# Patient Record
Sex: FEMALE | Race: WHITE | URBAN - METROPOLITAN AREA
[De-identification: names, ages, dates, MRNs, and addresses within clinical notes are randomized per-mention and may not be internally consistent; named-entity substitution may affect disease eponyms.]

---

## 2017-01-04 ENCOUNTER — OUTPATIENT (OUTPATIENT)
Dept: OUTPATIENT SERVICES | Facility: HOSPITAL | Age: 47
LOS: 1 days | Discharge: HOME | End: 2017-01-04

## 2017-06-27 DIAGNOSIS — Z12.11 ENCOUNTER FOR SCREENING FOR MALIGNANT NEOPLASM OF COLON: ICD-10-CM

## 2017-06-27 DIAGNOSIS — K64.4 RESIDUAL HEMORRHOIDAL SKIN TAGS: ICD-10-CM

## 2018-06-09 ENCOUNTER — OUTPATIENT (OUTPATIENT)
Dept: OUTPATIENT SERVICES | Facility: HOSPITAL | Age: 48
LOS: 1 days | Discharge: HOME | End: 2018-06-09

## 2018-06-09 DIAGNOSIS — Z12.31 ENCOUNTER FOR SCREENING MAMMOGRAM FOR MALIGNANT NEOPLASM OF BREAST: ICD-10-CM

## 2018-06-15 ENCOUNTER — OUTPATIENT (OUTPATIENT)
Dept: OUTPATIENT SERVICES | Facility: HOSPITAL | Age: 48
LOS: 1 days | Discharge: HOME | End: 2018-06-15

## 2018-06-15 DIAGNOSIS — R92.8 OTHER ABNORMAL AND INCONCLUSIVE FINDINGS ON DIAGNOSTIC IMAGING OF BREAST: ICD-10-CM

## 2019-12-04 ENCOUNTER — OUTPATIENT (OUTPATIENT)
Dept: OUTPATIENT SERVICES | Facility: HOSPITAL | Age: 49
LOS: 1 days | Discharge: HOME | End: 2019-12-04
Payer: COMMERCIAL

## 2019-12-04 DIAGNOSIS — Z12.31 ENCOUNTER FOR SCREENING MAMMOGRAM FOR MALIGNANT NEOPLASM OF BREAST: ICD-10-CM

## 2019-12-04 PROCEDURE — 77063 BREAST TOMOSYNTHESIS BI: CPT | Mod: 26

## 2019-12-04 PROCEDURE — 77067 SCR MAMMO BI INCL CAD: CPT | Mod: 26

## 2019-12-06 ENCOUNTER — OUTPATIENT (OUTPATIENT)
Dept: OUTPATIENT SERVICES | Facility: HOSPITAL | Age: 49
LOS: 1 days | Discharge: HOME | End: 2019-12-06
Payer: COMMERCIAL

## 2019-12-06 DIAGNOSIS — R92.8 OTHER ABNORMAL AND INCONCLUSIVE FINDINGS ON DIAGNOSTIC IMAGING OF BREAST: ICD-10-CM

## 2019-12-06 PROCEDURE — 76642 ULTRASOUND BREAST LIMITED: CPT | Mod: 26,LT

## 2019-12-06 PROCEDURE — 77065 DX MAMMO INCL CAD UNI: CPT | Mod: 26,LT

## 2019-12-06 PROCEDURE — G0279: CPT | Mod: 26,LT

## 2019-12-06 PROCEDURE — 77061 BREAST TOMOSYNTHESIS UNI: CPT | Mod: 26,LT

## 2021-01-29 ENCOUNTER — OUTPATIENT (OUTPATIENT)
Dept: OUTPATIENT SERVICES | Facility: HOSPITAL | Age: 51
LOS: 1 days | Discharge: HOME | End: 2021-01-29
Payer: COMMERCIAL

## 2021-01-29 DIAGNOSIS — Z12.31 ENCOUNTER FOR SCREENING MAMMOGRAM FOR MALIGNANT NEOPLASM OF BREAST: ICD-10-CM

## 2021-01-29 PROCEDURE — 77063 BREAST TOMOSYNTHESIS BI: CPT | Mod: 26

## 2021-01-29 PROCEDURE — 77067 SCR MAMMO BI INCL CAD: CPT | Mod: 26

## 2021-02-12 ENCOUNTER — APPOINTMENT (OUTPATIENT)
Dept: OTOLARYNGOLOGY | Facility: CLINIC | Age: 51
End: 2021-02-12
Payer: COMMERCIAL

## 2021-02-12 VITALS
SYSTOLIC BLOOD PRESSURE: 118 MMHG | BODY MASS INDEX: 21.21 KG/M2 | DIASTOLIC BLOOD PRESSURE: 70 MMHG | WEIGHT: 132 LBS | HEIGHT: 66 IN

## 2021-02-12 PROCEDURE — 92550 TYMPANOMETRY & REFLEX THRESH: CPT

## 2021-02-12 PROCEDURE — 99203 OFFICE O/P NEW LOW 30 MIN: CPT | Mod: 25

## 2021-02-12 PROCEDURE — 99072 ADDL SUPL MATRL&STAF TM PHE: CPT

## 2021-02-12 PROCEDURE — 31575 DIAGNOSTIC LARYNGOSCOPY: CPT

## 2021-02-12 PROCEDURE — 92557 COMPREHENSIVE HEARING TEST: CPT

## 2021-02-12 NOTE — ASSESSMENT
[FreeTextEntry1] : -throat discomfort:\par I discussed the pathophysiology of acid reflux and its effect on the laryngo-pharynx.\par I explained the need to first control the diet as much as possible with having early dinners, avoiding certain types of food in the evening including coffee, tomato sauce, chocolate, garlic...\par \par -Left conductive hearing loss, chronic, worsening: \par I reviewed, interpreted, and discussed the Audiogram done today.\par I recommended and discussed a CT scan to r/o a chronic otitis media  or cholesteatoma. I explained the need for surgery in case of cholesteatoma to avoid bone destruction and central nervous system complications.\par \par -impacted wax cleaned with a curette.\par I counseled the patient regarding avoiding overusing qtips and explained the risks of infection, eardrum perforation, ear canal irritation, and injury, impacted wax with conductive hearing loss...\par \par I reviewed patient's historical chart.\par \par

## 2021-02-12 NOTE — HISTORY OF PRESENT ILLNESS
[de-identified] : Patient presents today with c/o hearing loss in the left ear. Patient admits gradual hearing loss over the years. She hears a whistle in the ear. No otalgia. Patient has h/o ear tube in the left ear years ago. She admits fluid and pressure feeling in the left ear. No recent audiogram. \par History of ear tube on the left side years ago. \par \par Also complaining of throat discomfort with episodic changes in her voice where it would go back to normal.

## 2021-03-09 ENCOUNTER — OUTPATIENT (OUTPATIENT)
Dept: OUTPATIENT SERVICES | Facility: HOSPITAL | Age: 51
LOS: 1 days | Discharge: HOME | End: 2021-03-09
Payer: COMMERCIAL

## 2021-03-09 ENCOUNTER — RESULT REVIEW (OUTPATIENT)
Age: 51
End: 2021-03-09

## 2021-03-09 DIAGNOSIS — H90.12 CONDUCTIVE HEARING LOSS, UNILATERAL, LEFT EAR, WITH UNRESTRICTED HEARING ON THE CONTRALATERAL SIDE: ICD-10-CM

## 2021-03-09 PROCEDURE — 70481 CT ORBIT/EAR/FOSSA W/DYE: CPT | Mod: 26

## 2021-04-12 ENCOUNTER — APPOINTMENT (OUTPATIENT)
Dept: OTOLARYNGOLOGY | Facility: CLINIC | Age: 51
End: 2021-04-12
Payer: COMMERCIAL

## 2021-04-12 PROCEDURE — 99072 ADDL SUPL MATRL&STAF TM PHE: CPT

## 2021-04-12 PROCEDURE — 99214 OFFICE O/P EST MOD 30 MIN: CPT | Mod: 25

## 2021-04-12 PROCEDURE — 69210 REMOVE IMPACTED EAR WAX UNI: CPT

## 2021-04-12 NOTE — DATA REVIEWED
[de-identified] : EXAM:  CT TEMPORAL BONES IC\par \par \par PROCEDURE DATE:  03/09/2021\par \par \par \par \par INTERPRETATION:  CLINICAL INDICATION: Left-sided conductive hearing loss. Evaluate for otosclerosis, cholesteatoma.\par \par TECHNIQUE: Multidetector axial CT images of the temporal bones were obtained after the administration of 100 cc Optiray 320 intravenous contrast (25 cc discarded). Target axial, coronal and Poschl images were created through each temporal bone in bone windows.\par \par \par COMPARISON: None\par \par FINDINGS:\par \par RIGHT:\par Mastoid air cells: The mastoid air cells are slightly underpneumatized. There is trace fluid within the right mastoid air cells. The tegmen mastoideum is intact.\par \par Outer ear: The external auditory canal appears normal.  The tympanic membrane is intact.\par \par Middle ear: The middle ear cavity is clear. The ossicles and scutum are intact without erosion. The tegmen tympani is intact.\par \par Inner ear: The otic capsule and inner ear structures appear intact. The vestibular aqueduct is normal in size.\par \par Facial nerve canal: Normal course and caliber.\par \par Vascular structures:  The sigmoid plate is intact.  The carotid canal is covered by bone.\par \par Internal auditory canal: Normal in size\par \par \par LEFT:\par Mastoid air cells: The mastoid air cells are well developed and clear. The tegmen mastoideum is intact.\par \par Outer ear:  The external auditory canal appears normal. The tympanic membrane is intact.\par \par Middle ear: The middle ear cavity is clear. The ossicles and scutum are intact without erosion. The tegmen tympani is intact.\par \par Inner ear: The otic capsule and inner ear structures appear intact. The vestibular aqueduct is slightly prominent in size but within normal limits.\par \par Facial nerve canal: Normal course and caliber.\par \par Vascular structures:  The sigmoid plate is intact.  The carotid canal is covered by bone.\par \par Internal auditory canal: Normal in size\par \par \par Other:\par Partially visualized intracranial structures: Normal.\par \par Partially visualized paranasal sinuses: Minimal mucosal thickening of the left maxillary sinus.\par \par IMPRESSION:\par \par Mild prominence of the left testicle aqueduct which is still within normal limits. Otherwise unremarkable CT of the temporal bone.\par \par Trace right mastoid effusion.\par \par Essentially unremarkable CT examination of the temporal bones.\par \par \par \par \par HONG STOCKTON M.D., RESIDENT RADIOLOGIST\par This document has been electronically signed.\par ROMERO HAYWOOD M.D., ATTENDING RADIOLOGIST\par This document has been electronically signed. Mar  9 2021  1:00PM\par \par  \par \par  Ordered by: AURELIA CHAVARRIA       Collected/Examined: 09Mar2021 08:35AM       \par Verified by: AURELIA CHAVARRIA 09Mar2021 07:08PM       \par  Result Communication: No patient communication needed at this time;\par Stage: Final       \par  Performed at: [facility]       Resulted: 09Mar2021 11:07AM       Last Updated: 09Mar2021 07:08PM       Accession: [id number]

## 2021-04-12 NOTE — HISTORY OF PRESENT ILLNESS
[de-identified] : Patient presents today with c/o hearing loss in the left ear. Patient admits gradual hearing loss over the years. She hears a whistle in the ear. No otalgia. Patient has h/o ear tube in the left ear years ago. She admits fluid and pressure feeling in the left ear. No recent audiogram. \par History of ear tube on the left side years ago. \par \par Also complaining of throat discomfort with episodic changes in her voice where it would go back to normal. [FreeTextEntry1] : \par 4/12/21: Patient presents today following up on hearing loss.  Patient sent for CT scan of temporal bones;

## 2021-04-12 NOTE — ASSESSMENT
[FreeTextEntry1] : I personally reviewed, interpreted and discussed patient's CT temporal bones images. No cholesteatoma.\par \par I explained to the patient the alternatives of hearing aids vs surgery for middle ear exploration and repair. Patient decided to see Dr Peters to discuss surgery first and will let us know.\par

## 2021-04-12 NOTE — PHYSICAL EXAM
[Midline] : trachea located in midline position [Normal] : no rashes [de-identified] : bilateral cerumen impaction, cleaned with curette.

## 2021-05-11 ENCOUNTER — APPOINTMENT (OUTPATIENT)
Dept: OTOLARYNGOLOGY | Facility: CLINIC | Age: 51
End: 2021-05-11
Payer: COMMERCIAL

## 2021-05-11 PROCEDURE — 99215 OFFICE O/P EST HI 40 MIN: CPT | Mod: 25

## 2021-05-11 PROCEDURE — 99072 ADDL SUPL MATRL&STAF TM PHE: CPT

## 2021-05-11 PROCEDURE — 92504 EAR MICROSCOPY EXAMINATION: CPT

## 2021-05-11 NOTE — PHYSICAL EXAM
[FreeTextEntry1] : Procedure: Microscopic Ear Exam\par \par Left ear:  \par Ear canal patent. Tympanic membrane intact. Thickened with mild posterior tympanic membrane retraction. No inflammation or effusion noted.\par \par \par Right ear:  \par Ear canal patent. Tympanic membrane intact. Posterior retraction pocket with contact to the ossicular chain and apparent acute mild contusion. No effusion or acute inflammation noted.\par \par T[place]ork Hearing Assessment\par 512 Hz:\par Henriquez test: referred to the left ear\par Rinne test:\par 	Right Ear: Air Conduction > Bone Conduction\par 	Left Ear:   Air Conduction < Bone conduction\par  [Normal] : mucosa is normal [Midline] : trachea located in midline position

## 2021-05-11 NOTE — CONSULT LETTER
[Please see my note below.] : Please see my note below. [FreeTextEntry2] : Dear Dr Chani Cedeño\par [address] [facility] [FreeTextEntry1] : Thank you for allowing me to participate in the care of LELE HOUGH .\par Please see the attached visit note.\par \par \par \par Dg Peters\par Otology\par Medical Director of [facility]\par Department of Otolaryngology\par [facility]

## 2021-05-11 NOTE — HISTORY OF PRESENT ILLNESS
[de-identified] : LELE HOUGH has a history of progressive hearing loss in the left ear for several years.  Previously treated for otitis media with ventilation tube with some benefit.  Many ear infections as a child. NO otorrhea for many years. Hearing loss has interfered with communication at times.\par difficulty reported with the ears due to [place] challenge. The patient states that she flew yesterdaywith some discomfort in the ears.

## 2021-05-11 NOTE — ASSESSMENT
[FreeTextEntry1] : Conductive hearing loss in the left ear with a prior history of infectious ear disease and eustachian tube dysfunction. Management options reviewed with the patient in detail including observation, amplification, and surgical intervention. She was to consider surgical intervention.All risks limitations, complications and alternatives reviewed in detail.  Questions answered.\par \par Surgical plan: Left middle ear exploration for ossiculoplasty, possible stapedectomy, cartilage graft\par \par

## 2021-05-11 NOTE — REASON FOR VISIT
[Initial Evaluation] : an initial evaluation for [Ear Pain] : ear pain [Hearing Loss] : hearing loss [Tinnitus] : tinnitus

## 2022-04-26 ENCOUNTER — APPOINTMENT (OUTPATIENT)
Dept: OTOLARYNGOLOGY | Facility: CLINIC | Age: 52
End: 2022-04-26
Payer: COMMERCIAL

## 2022-04-26 DIAGNOSIS — Z78.9 OTHER SPECIFIED HEALTH STATUS: ICD-10-CM

## 2022-04-26 DIAGNOSIS — H61.21 IMPACTED CERUMEN, RIGHT EAR: ICD-10-CM

## 2022-04-26 DIAGNOSIS — H69.83 OTHER SPECIFIED DISORDERS OF EUSTACHIAN TUBE, BILATERAL: ICD-10-CM

## 2022-04-26 DIAGNOSIS — J30.0 VASOMOTOR RHINITIS: ICD-10-CM

## 2022-04-26 PROCEDURE — 99214 OFFICE O/P EST MOD 30 MIN: CPT | Mod: 25

## 2022-04-26 PROCEDURE — 92550 TYMPANOMETRY & REFLEX THRESH: CPT

## 2022-04-26 PROCEDURE — 92557 COMPREHENSIVE HEARING TEST: CPT

## 2022-04-26 PROCEDURE — 31231 NASAL ENDOSCOPY DX: CPT

## 2022-04-26 PROCEDURE — G0268 REMOVAL OF IMPACTED WAX MD: CPT

## 2022-04-26 NOTE — HISTORY OF PRESENT ILLNESS
[de-identified] : LELE OHUGH has a history of progressive hearing loss in the left ear for several years.  Previously treated for otitis media with ventilation tube with some benefit.  Many ear infections as a child. NO otorrhea for many years. Hearing loss has interfered with communication at times.\par  [FreeTextEntry1] : 04/26/2022 \par hearing may be worse,  Pain reported with recent air travel. No otorrhea. tinnitus reported in the left ear. No recent vertigo.  Also reports significant nasal airway obstruction and postnasal drip.

## 2022-04-26 NOTE — ASSESSMENT
[FreeTextEntry1] : Evidence of bilateral eustachian tube dysfunction causing right tympanic membrane retraction and left barotrauma.  There is significant conductive hearing loss present in the left ear.  I suspect incus erosion or fixation.  Other etiologies are possible including otosclerosis.  I have discussed this with her in detail.  We reviewed management options in detail.  All risks limitations, complications and alternatives reviewed in detail.  Questions answered.\par \par Management for chronic rhinitis discussed.  Consistent use of steroidal nasal spray recommended.\par \par Surgical plan: Bilateral eustachian tube balloon dilation, left tympanoplasty for ossicular chain reconstruction, possible stapedectomy.  Cartilage and perichondrial graft.

## 2022-04-26 NOTE — PHYSICAL EXAM
[FreeTextEntry1] : Microscopic ear exam with cerumen debridement:\par \par Right ear: Obstructing cerumen was debrided from the ear canal using suction, and curet.  The ear canal was otherwise within normal limits.  The tympanic membrane was intact and noninflamed.  Posterior superior retraction onto the ossicular chain without visible erosion.\par \par Left ear: Obstructing cerumen was debrided from the ear canal using suction, and curets.  The ear canal was otherwise within normal limits.  The tympanic membrane was intact and noninflamed.  Absent incus shadow [Nasal Endoscopy Performed] : nasal endoscopy was performed, see procedure section for findings [de-identified] : Enlarged [Normal] : mucosa is normal [Midline] : trachea located in midline position

## 2022-04-26 NOTE — DATA REVIEWED
[de-identified] : In light of the patients current symptoms, Complete audiometry was ordered and completed today. I have interpreted these results and reviewed them in detail with the patient.\par \par

## 2022-04-26 NOTE — REASON FOR VISIT
[Subsequent Evaluation] : a subsequent evaluation for [Hearing Loss] : hearing loss [FreeTextEntry2] : ear pressure

## 2022-04-26 NOTE — PROCEDURE
[FreeTextEntry6] : PROCEDURE:  NASAL ENDOSCOPY  \par \par Surgeon: Dr. Peters\par Indication: Chronic Rhinitis\par Anesthetic: Topical lidocaine and Afrin\par Procedure: The patient was placed in a sitting position.  Following application of the topical anesthetic and decongestant, exam was performed with a flexible endoscope.  The following anatomic sites were directly examined bilaterally in a sequential fashion:\par The scope was introduced in the nasal passage between the middle and inferior turbinates to exam the inferior portion of the middle meatus and the fontanelle, as well as the maxillary ostia. Next, the scope was passed medially and posteriorly to the middle turbinates to examine the sphenoethmoid recess and the superior turbinate region.\par \par Nasopharynx: no masses, choanae patent, no adenoid tissue\par \par The following findings were noted:\par Mild to moderate turbinate hypertrophy bilaterally with partial airway obstruction.  No purulence or polypoid disease noted.  The nasopharynx was nonobstructed.\par

## 2022-05-19 ENCOUNTER — APPOINTMENT (OUTPATIENT)
Age: 52
End: 2022-05-19
Payer: COMMERCIAL

## 2022-05-19 ENCOUNTER — RESULT REVIEW (OUTPATIENT)
Age: 52
End: 2022-05-19

## 2022-05-19 ENCOUNTER — TRANSCRIPTION ENCOUNTER (OUTPATIENT)
Age: 52
End: 2022-05-19

## 2022-05-19 ENCOUNTER — NON-APPOINTMENT (OUTPATIENT)
Age: 52
End: 2022-05-19

## 2022-05-19 PROCEDURE — 69631 REPAIR EARDRUM STRUCTURES: CPT | Mod: LT

## 2022-05-19 PROCEDURE — 95867 NDL EMG CRANIAL NRV MUSC UNI: CPT | Mod: 26

## 2022-05-19 PROCEDURE — 21235 EAR CARTILAGE GRAFT: CPT | Mod: LT,59

## 2022-05-19 PROCEDURE — 69660 REVISE MIDDLE EAR BONE: CPT | Mod: LT

## 2022-05-19 RX ORDER — CEFADROXIL 500 MG/1
500 CAPSULE ORAL TWICE DAILY
Qty: 10 | Refills: 0 | Status: ACTIVE | COMMUNITY
Start: 2022-05-19 | End: 1900-01-01

## 2022-05-19 RX ORDER — METHYLPREDNISOLONE 4 MG/1
4 TABLET ORAL
Qty: 1 | Refills: 0 | Status: ACTIVE | COMMUNITY
Start: 2022-05-19 | End: 1900-01-01

## 2022-05-24 ENCOUNTER — APPOINTMENT (OUTPATIENT)
Dept: OTOLARYNGOLOGY | Facility: CLINIC | Age: 52
End: 2022-05-24

## 2022-06-01 ENCOUNTER — APPOINTMENT (OUTPATIENT)
Dept: OTOLARYNGOLOGY | Facility: CLINIC | Age: 52
End: 2022-06-01
Payer: COMMERCIAL

## 2022-06-01 VITALS — HEIGHT: 66 IN | TEMPERATURE: 97.3 F | BODY MASS INDEX: 21.21 KG/M2 | WEIGHT: 132 LBS

## 2022-06-01 PROCEDURE — 99024 POSTOP FOLLOW-UP VISIT: CPT

## 2022-06-01 NOTE — REASON FOR VISIT
[de-identified] : 1st post op [de-identified] : History\par post operative visit.\par Reports no significant pain.  No significant tinnitus.  No significant vertigo.  No bleeding reported.\par \par Physical Exam\par \par Face: Normal Function bilaterally\par \par Oral: Normal\par \par Neck: No mass, Full range of motion\par \par Hand: Incision intact, non inflamed\par \par \par Microscopic Ear Exam\par Left Ear:  Ear canal packing removed gently with forceps.  No significant inflammation noted.  The tympanic membrane is intact.\par \par Tuning Fork Testing\par 512 Hz:\par Henriquez test: referred to the Left Ear\par \par Wound care instructions were reviewed with the patient in detail. Followup plans discussed.

## 2022-06-12 DIAGNOSIS — J06.9 ACUTE UPPER RESPIRATORY INFECTION, UNSPECIFIED: ICD-10-CM

## 2022-06-12 RX ORDER — AZITHROMYCIN 250 MG/1
250 TABLET, FILM COATED ORAL
Qty: 6 | Refills: 0 | Status: ACTIVE | COMMUNITY
Start: 2022-06-12 | End: 1900-01-01

## 2022-06-12 RX ORDER — PREDNISONE 20 MG/1
20 TABLET ORAL
Qty: 9 | Refills: 0 | Status: ACTIVE | COMMUNITY
Start: 2022-06-12 | End: 1900-01-01

## 2022-06-13 ENCOUNTER — NON-APPOINTMENT (OUTPATIENT)
Age: 52
End: 2022-06-13

## 2022-06-22 ENCOUNTER — APPOINTMENT (OUTPATIENT)
Dept: OTOLARYNGOLOGY | Facility: CLINIC | Age: 52
End: 2022-06-22
Payer: COMMERCIAL

## 2022-06-22 VITALS — TEMPERATURE: 95.4 F | WEIGHT: 132 LBS | HEIGHT: 66 IN | BODY MASS INDEX: 21.21 KG/M2

## 2022-06-22 DIAGNOSIS — H90.3 SENSORINEURAL HEARING LOSS, BILATERAL: ICD-10-CM

## 2022-06-22 DIAGNOSIS — H90.12 CONDUCTIVE HEARING LOSS, UNILATERAL, LEFT EAR, WITH UNRESTRICTED HEARING ON THE CONTRALATERAL SIDE: ICD-10-CM

## 2022-06-22 PROCEDURE — 92567 TYMPANOMETRY: CPT

## 2022-06-22 PROCEDURE — 99024 POSTOP FOLLOW-UP VISIT: CPT

## 2022-06-22 PROCEDURE — 92557 COMPREHENSIVE HEARING TEST: CPT

## 2022-06-22 NOTE — HISTORY OF PRESENT ILLNESS
[de-identified] : LELE HOUGH has a history of progressive hearing loss in the left ear for several years.  Previously treated for otitis media with ventilation tube with some benefit.  Many ear infections as a child. NO otorrhea for many years. Hearing loss has interfered with communication at times.\par  [FreeTextEntry1] : 06/22/2022 \par History\par post operative visit.\par Reports no significant pain.  No significant tinnitus.  No significant vertigo. Hearing has improved\par \par Physical Exam\par \par Hand: Incision intact, non inflamed\par \par Microscopic Ear Exam\par Left Ear:  Ear canal healing well.  The tympanic membrane is intact.\par \par Complete audiometry was ordered and completed today. This was separately reported by the audiologist. The results were reviewed in detail with the patient.\par

## 2022-08-23 ENCOUNTER — OUTPATIENT (OUTPATIENT)
Dept: OUTPATIENT SERVICES | Facility: HOSPITAL | Age: 52
LOS: 1 days | Discharge: HOME | End: 2022-08-23

## 2022-08-23 DIAGNOSIS — Z12.31 ENCOUNTER FOR SCREENING MAMMOGRAM FOR MALIGNANT NEOPLASM OF BREAST: ICD-10-CM

## 2022-08-23 PROCEDURE — 77063 BREAST TOMOSYNTHESIS BI: CPT | Mod: 26

## 2022-08-23 PROCEDURE — 77067 SCR MAMMO BI INCL CAD: CPT | Mod: 26

## 2022-09-07 ENCOUNTER — OUTPATIENT (OUTPATIENT)
Dept: OUTPATIENT SERVICES | Facility: HOSPITAL | Age: 52
LOS: 1 days | Discharge: HOME | End: 2022-09-07

## 2022-09-07 DIAGNOSIS — R92.8 OTHER ABNORMAL AND INCONCLUSIVE FINDINGS ON DIAGNOSTIC IMAGING OF BREAST: ICD-10-CM

## 2022-09-07 PROCEDURE — G0279: CPT | Mod: 26

## 2022-09-07 PROCEDURE — 77065 DX MAMMO INCL CAD UNI: CPT | Mod: 26,LT

## 2022-09-07 PROCEDURE — 76642 ULTRASOUND BREAST LIMITED: CPT | Mod: 26,LT

## 2022-09-13 ENCOUNTER — APPOINTMENT (OUTPATIENT)
Dept: OTOLARYNGOLOGY | Facility: CLINIC | Age: 52
End: 2022-09-13

## 2022-09-14 RX ORDER — AMOXICILLIN AND CLAVULANATE POTASSIUM 875; 125 MG/1; MG/1
875-125 TABLET, COATED ORAL
Qty: 14 | Refills: 0 | Status: ACTIVE | COMMUNITY
Start: 2022-09-14 | End: 1900-01-01

## 2022-09-16 ENCOUNTER — APPOINTMENT (OUTPATIENT)
Dept: OTOLARYNGOLOGY | Facility: CLINIC | Age: 52
End: 2022-09-16

## 2022-09-16 VITALS — BODY MASS INDEX: 21.21 KG/M2 | HEIGHT: 66 IN | WEIGHT: 132 LBS

## 2022-09-16 DIAGNOSIS — R07.0 PAIN IN THROAT: ICD-10-CM

## 2022-09-16 DIAGNOSIS — R22.1 LOCALIZED SWELLING, MASS AND LUMP, NECK: ICD-10-CM

## 2022-09-16 PROCEDURE — 99214 OFFICE O/P EST MOD 30 MIN: CPT | Mod: 25

## 2022-09-16 PROCEDURE — 31575 DIAGNOSTIC LARYNGOSCOPY: CPT

## 2022-09-16 RX ORDER — OMEPRAZOLE 40 MG/1
40 CAPSULE, DELAYED RELEASE ORAL
Qty: 30 | Refills: 2 | Status: ACTIVE | COMMUNITY
Start: 2022-09-16 | End: 1900-01-01

## 2022-09-16 NOTE — HISTORY OF PRESENT ILLNESS
[FreeTextEntry1] : Patient following up today on conductive hearing loss of left ear .  Patient states her hearing has improved.  She has today c/o lump on right side of her neck.  She noticed the lump 2 days ago .  She started antibiotic yesterday.  She denies any pain.\par

## 2022-09-16 NOTE — ASSESSMENT
[FreeTextEntry1] : I discussed the pathophysiology of acid reflux and its effect on the laryngo-pharynx.\par I explained the need to first control the diet as much as possible with having early dinners, avoiding certain types of food in the evening including coffee, tomato sauce, chocolate, garlic...\par I also explained the need for medication when needed. I also discussed the effect and safety profile of the medication.\par \par Patient to continue Augmentin.\par

## 2022-09-16 NOTE — PHYSICAL EXAM
[de-identified] : right neck mass [Normal] : mucosa is normal [Midline] : trachea located in midline position

## 2022-09-16 NOTE — REASON FOR VISIT
[Subsequent Evaluation] : a subsequent evaluation for [FreeTextEntry2] : conductive hearing loss of left ear, lump on right side of her neck

## 2022-09-26 ENCOUNTER — APPOINTMENT (OUTPATIENT)
Dept: OTOLARYNGOLOGY | Facility: CLINIC | Age: 52
End: 2022-09-26

## 2022-09-27 ENCOUNTER — APPOINTMENT (OUTPATIENT)
Dept: OTOLARYNGOLOGY | Facility: CLINIC | Age: 52
End: 2022-09-27

## 2024-03-21 ENCOUNTER — OUTPATIENT (OUTPATIENT)
Dept: OUTPATIENT SERVICES | Facility: HOSPITAL | Age: 54
LOS: 1 days | End: 2024-03-21
Payer: COMMERCIAL

## 2024-03-21 DIAGNOSIS — Z12.31 ENCOUNTER FOR SCREENING MAMMOGRAM FOR MALIGNANT NEOPLASM OF BREAST: ICD-10-CM

## 2024-03-21 PROCEDURE — 77063 BREAST TOMOSYNTHESIS BI: CPT | Mod: 26

## 2024-03-21 PROCEDURE — 77067 SCR MAMMO BI INCL CAD: CPT | Mod: 26

## 2024-03-21 PROCEDURE — 77063 BREAST TOMOSYNTHESIS BI: CPT

## 2024-03-21 PROCEDURE — 77067 SCR MAMMO BI INCL CAD: CPT

## 2024-03-22 DIAGNOSIS — Z12.31 ENCOUNTER FOR SCREENING MAMMOGRAM FOR MALIGNANT NEOPLASM OF BREAST: ICD-10-CM

## 2024-03-28 ENCOUNTER — OUTPATIENT (OUTPATIENT)
Dept: OUTPATIENT SERVICES | Facility: HOSPITAL | Age: 54
LOS: 1 days | End: 2024-03-28
Payer: COMMERCIAL

## 2024-03-28 DIAGNOSIS — R92.8 OTHER ABNORMAL AND INCONCLUSIVE FINDINGS ON DIAGNOSTIC IMAGING OF BREAST: ICD-10-CM

## 2024-03-28 PROCEDURE — 76642 ULTRASOUND BREAST LIMITED: CPT | Mod: RT

## 2024-03-28 PROCEDURE — 76642 ULTRASOUND BREAST LIMITED: CPT | Mod: 26,RT

## 2024-03-29 DIAGNOSIS — R92.8 OTHER ABNORMAL AND INCONCLUSIVE FINDINGS ON DIAGNOSTIC IMAGING OF BREAST: ICD-10-CM

## 2025-07-17 ENCOUNTER — TRANSCRIPTION ENCOUNTER (OUTPATIENT)
Age: 55
End: 2025-07-17

## 2025-07-17 ENCOUNTER — OUTPATIENT (OUTPATIENT)
Dept: OUTPATIENT SERVICES | Facility: HOSPITAL | Age: 55
LOS: 1 days | End: 2025-07-17
Payer: COMMERCIAL

## 2025-07-17 DIAGNOSIS — R92.2 INCONCLUSIVE MAMMOGRAM: ICD-10-CM

## 2025-07-17 DIAGNOSIS — Z12.31 ENCOUNTER FOR SCREENING MAMMOGRAM FOR MALIGNANT NEOPLASM OF BREAST: ICD-10-CM

## 2025-07-17 PROCEDURE — 77063 BREAST TOMOSYNTHESIS BI: CPT

## 2025-07-17 PROCEDURE — 77067 SCR MAMMO BI INCL CAD: CPT | Mod: 26

## 2025-07-17 PROCEDURE — 77067 SCR MAMMO BI INCL CAD: CPT

## 2025-07-17 PROCEDURE — 76641 ULTRASOUND BREAST COMPLETE: CPT | Mod: 50

## 2025-07-17 PROCEDURE — 76641 ULTRASOUND BREAST COMPLETE: CPT | Mod: 26,50

## 2025-07-17 PROCEDURE — 77063 BREAST TOMOSYNTHESIS BI: CPT | Mod: 26

## 2025-07-18 DIAGNOSIS — R92.2 INCONCLUSIVE MAMMOGRAM: ICD-10-CM

## 2025-07-18 DIAGNOSIS — Z12.31 ENCOUNTER FOR SCREENING MAMMOGRAM FOR MALIGNANT NEOPLASM OF BREAST: ICD-10-CM
